# Patient Record
Sex: MALE | Race: WHITE
[De-identification: names, ages, dates, MRNs, and addresses within clinical notes are randomized per-mention and may not be internally consistent; named-entity substitution may affect disease eponyms.]

---

## 2019-03-22 ENCOUNTER — HOSPITAL ENCOUNTER (OUTPATIENT)
Dept: HOSPITAL 19 - ZLAB.WCH | Age: 74
End: 2019-03-22

## 2019-03-22 ENCOUNTER — HOSPITAL ENCOUNTER (OUTPATIENT)
Dept: HOSPITAL 6 - LAB | Age: 74
End: 2019-03-22
Attending: FAMILY MEDICINE
Payer: MEDICARE

## 2019-03-22 DIAGNOSIS — E34.9: ICD-10-CM

## 2019-03-22 DIAGNOSIS — E03.9: Primary | ICD-10-CM

## 2019-03-22 DIAGNOSIS — Z01.89: Primary | ICD-10-CM

## 2019-03-22 LAB — TESTOST SERPL-MCNC: 290 NG/DL (ref 221–716)

## 2019-03-25 ENCOUNTER — HOSPITAL ENCOUNTER (OUTPATIENT)
Dept: HOSPITAL 19 - ZLAB.WCH | Age: 74
End: 2019-03-25

## 2019-03-25 ENCOUNTER — HOSPITAL ENCOUNTER (OUTPATIENT)
Dept: HOSPITAL 6 - LAB | Age: 74
End: 2019-03-25
Attending: FAMILY MEDICINE
Payer: MEDICARE

## 2019-03-25 DIAGNOSIS — Z00.00: Primary | ICD-10-CM

## 2019-03-25 DIAGNOSIS — Z01.89: Primary | ICD-10-CM

## 2019-03-25 DIAGNOSIS — J30.1: ICD-10-CM

## 2019-03-25 DIAGNOSIS — E11.9: ICD-10-CM

## 2019-03-25 LAB
ALBUMIN SERPL-MCNC: 4.4 G/DL (ref 3.5–5)
ALT SERPL-CCNC: 25 U/L (ref 21–72)
AST SERPL-CCNC: 26 U/L (ref 17–59)
BASOPHILS # BLD: 0.1 10*3/UL (ref 0.02–0.1)
BILIRUB SERPL-MCNC: 0.6 MG/DL (ref 0.2–1.3)
CALCIUM SERPL-MCNC: 9.4 MG/DL (ref 8.4–10.2)
CO2 SERPL-SCNC: 25 MMOL/L (ref 22–30)
EOSINOPHIL # BLD: 0.2 10*3/UL (ref 0.04–0.4)
EOSINOPHIL NFR BLD: 3.2 % (ref 0–4)
ERYTHROCYTE [DISTWIDTH] IN BLOOD BY AUTOMATED COUNT: 15.1 % (ref 11.5–14.5)
GLUCOSE SERPL-MCNC: 99 MG/DL (ref 75–110)
HCT VFR BLD AUTO: 41.5 % (ref 42–52)
HGB BLD-MCNC: 13.3 G/DL (ref 13.5–18)
LYMPHOCYTES # BLD: 1.5 10*3/UL (ref 1.5–4)
MCH RBC QN AUTO: 29 PG (ref 27–31)
MCHC RBC AUTO-ENTMCNC: 32 G/DL (ref 33–37)
MCV RBC AUTO: 90 FL (ref 78–100)
MONOCYTES # BLD: 0.7 10*3/UL (ref 0.2–0.8)
NEUTROPHILS # BLD: 3.7 10*3/UL (ref 1.4–6.5)
PLATELET # BLD AUTO: 181 K/MM3 (ref 130–400)
PMV BLD AUTO: 10 FL (ref 7.4–10.4)
POTASSIUM SERPL-SCNC: 4.2 MMOL/L (ref 3.6–5)
PROT SERPL-MCNC: 7.4 G/DL (ref 6.3–8.2)
RBC # BLD AUTO: 4.61 M/MM3 (ref 4.2–5.6)
SODIUM SERPL-SCNC: 142 MMOL/L (ref 137–145)
WBC # BLD AUTO: 6.2 K/MM3 (ref 4.8–10.8)

## 2019-08-05 ENCOUNTER — HOSPITAL ENCOUNTER (OUTPATIENT)
Dept: HOSPITAL 19 - COL.CR | Age: 74
LOS: 1 days | Discharge: HOME | End: 2019-08-06
Payer: MEDICARE

## 2019-08-05 DIAGNOSIS — Z48.812: Primary | ICD-10-CM

## 2019-08-05 DIAGNOSIS — Z98.61: ICD-10-CM

## 2019-08-12 ENCOUNTER — HOSPITAL ENCOUNTER (OUTPATIENT)
Dept: HOSPITAL 19 - COL.CR | Age: 74
LOS: 90 days | Discharge: HOME | End: 2019-11-10
Payer: MEDICARE

## 2019-08-12 DIAGNOSIS — Z95.5: ICD-10-CM

## 2019-08-12 DIAGNOSIS — Z48.812: Primary | ICD-10-CM

## 2019-08-13 ENCOUNTER — HOSPITAL ENCOUNTER (OUTPATIENT)
Dept: HOSPITAL 19 - COL.PUL | Age: 74
End: 2019-08-13
Payer: MEDICARE

## 2019-08-13 DIAGNOSIS — R06.00: Primary | ICD-10-CM

## 2019-09-11 ENCOUNTER — HOSPITAL ENCOUNTER (OUTPATIENT)
Dept: HOSPITAL 6 - LAB | Age: 74
End: 2019-09-11
Attending: FAMILY MEDICINE
Payer: MEDICARE

## 2019-09-11 DIAGNOSIS — E11.9: Primary | ICD-10-CM

## 2020-07-09 ENCOUNTER — HOSPITAL ENCOUNTER (OUTPATIENT)
Dept: HOSPITAL 6 - LAB | Age: 75
End: 2020-07-09
Attending: FAMILY MEDICINE
Payer: MEDICARE

## 2020-07-09 DIAGNOSIS — E03.9: ICD-10-CM

## 2020-07-09 DIAGNOSIS — Z00.00: Primary | ICD-10-CM

## 2020-07-09 DIAGNOSIS — Z12.5: ICD-10-CM

## 2020-07-09 DIAGNOSIS — E78.00: ICD-10-CM

## 2020-07-09 DIAGNOSIS — E11.9: ICD-10-CM

## 2020-07-09 DIAGNOSIS — E34.9: ICD-10-CM

## 2020-07-09 LAB
ALBUMIN SERPL-MCNC: 4.2 G/DL (ref 3.4–4.8)
ALT SERPL-CCNC: 42 U/L (ref 0–55)
AST SERPL-CCNC: 31 U/L (ref 5–34)
BILIRUB SERPL-MCNC: 0.5 MG/DL (ref 0.2–1.2)
CALCIUM SERPL-MCNC: 9.1 MG/DL (ref 8.3–10.5)
CO2 SERPL-SCNC: 20 MMOL/L (ref 23–31)
ERYTHROCYTE [DISTWIDTH] IN BLOOD BY AUTOMATED COUNT: 14.9 % (ref 11.5–14.5)
GLUCOSE SERPL-MCNC: 108 MG/DL (ref 75–110)
HCT VFR BLD AUTO: 43.5 % (ref 42–52)
HGB BLD-MCNC: 14 G/DL (ref 13.5–18)
MCH RBC QN AUTO: 29 PG (ref 27–31)
MCHC RBC AUTO-ENTMCNC: 32 G/DL (ref 33–37)
MCV RBC AUTO: 89 FL (ref 78–100)
PLATELET # BLD AUTO: 158 K/MM3 (ref 130–400)
PMV BLD AUTO: 9.9 FL (ref 7.4–10.4)
POTASSIUM SERPL-SCNC: 4.2 MMOL/L (ref 3.5–5.1)
PROT SERPL-MCNC: 7.4 G/DL (ref 6.2–8.1)
RBC # BLD AUTO: 4.91 M/MM3 (ref 4.2–5.6)
SODIUM SERPL-SCNC: 141 MMOL/L (ref 136–145)
WBC # BLD AUTO: 5.6 K/MM3 (ref 4.8–10.8)

## 2020-07-10 LAB
LYMPHOCYTES NFR BLD MANUAL: 28 % (ref 20–51)
MONOCYTES NFR BLD: 13 % (ref 3–10)
NEUTS SEG NFR BLD MANUAL: 55 % (ref 42–75)

## 2021-01-26 ENCOUNTER — HOSPITAL ENCOUNTER (OUTPATIENT)
Dept: HOSPITAL 6 - LAB | Age: 76
End: 2021-01-26
Attending: FAMILY MEDICINE
Payer: MEDICARE

## 2021-01-26 DIAGNOSIS — Z12.5: ICD-10-CM

## 2021-01-26 DIAGNOSIS — E78.5: ICD-10-CM

## 2021-01-26 DIAGNOSIS — Z00.00: Primary | ICD-10-CM

## 2021-01-26 DIAGNOSIS — E03.9: ICD-10-CM

## 2021-01-26 DIAGNOSIS — E13.9: ICD-10-CM

## 2021-01-26 LAB
ALBUMIN SERPL-MCNC: 4.7 G/DL (ref 3.4–4.8)
ALT SERPL-CCNC: 28 U/L (ref 0–55)
AST SERPL-CCNC: 23 U/L (ref 5–34)
BASOPHILS # BLD: 0.1 10*3/UL (ref 0.02–0.1)
BILIRUB SERPL-MCNC: 0.8 MG/DL (ref 0.2–1.2)
CALCIUM SERPL-MCNC: 9.6 MG/DL (ref 8.3–10.5)
CO2 SERPL-SCNC: 23 MMOL/L (ref 23–31)
CREAT UR-MCNC: 115 MG/DL
EOSINOPHIL # BLD: 0.1 10*3/UL (ref 0.04–0.4)
EOSINOPHIL NFR BLD: 1.2 % (ref 0–4)
ERYTHROCYTE [DISTWIDTH] IN BLOOD BY AUTOMATED COUNT: 14.6 % (ref 11.5–14.5)
GLUCOSE SERPL-MCNC: 100 MG/DL (ref 75–110)
HCT VFR BLD AUTO: 46.5 % (ref 42–52)
HGB BLD-MCNC: 14.9 G/DL (ref 13.5–18)
LYMPHOCYTES # BLD: 1.6 10*3/UL (ref 1.5–4)
MCH RBC QN AUTO: 29 PG (ref 27–31)
MCHC RBC AUTO-ENTMCNC: 32 G/DL (ref 33–37)
MCV RBC AUTO: 90 FL (ref 78–100)
MONOCYTES # BLD: 0.7 10*3/UL (ref 0.2–0.8)
NEUTROPHILS # BLD: 4.1 10*3/UL (ref 1.4–6.5)
PLATELET # BLD AUTO: 178 K/MM3 (ref 130–400)
PMV BLD AUTO: 9.7 FL (ref 7.4–10.4)
POTASSIUM SERPL-SCNC: 4.4 MMOL/L (ref 3.5–5.1)
PROT SERPL-MCNC: 7.9 G/DL (ref 6.2–8.1)
RBC # BLD AUTO: 5.19 M/MM3 (ref 4.2–5.6)
SODIUM SERPL-SCNC: 141 MMOL/L (ref 136–145)
WBC # BLD AUTO: 6.5 K/MM3 (ref 4.8–10.8)

## 2021-01-27 ENCOUNTER — HOSPITAL ENCOUNTER (OUTPATIENT)
Dept: HOSPITAL 6 - LAB | Age: 76
End: 2021-01-27
Attending: FAMILY MEDICINE
Payer: MEDICARE

## 2021-01-27 DIAGNOSIS — M43.8X6: ICD-10-CM

## 2021-01-27 DIAGNOSIS — M47.816: Primary | ICD-10-CM

## 2022-07-29 ENCOUNTER — HOSPITAL ENCOUNTER (OUTPATIENT)
Dept: HOSPITAL 19 - WSPT | Age: 77
LOS: 2 days | Discharge: HOME | End: 2022-07-31
Attending: ORTHOPAEDIC SURGERY
Payer: MEDICARE

## 2022-07-29 DIAGNOSIS — Z96.651: Primary | ICD-10-CM

## 2022-08-31 ENCOUNTER — HOSPITAL ENCOUNTER (OUTPATIENT)
Dept: HOSPITAL 19 - WSPT | Age: 77
Discharge: HOME | End: 2022-08-31
Attending: ORTHOPAEDIC SURGERY
Payer: MEDICARE

## 2022-08-31 DIAGNOSIS — Z96.651: Primary | ICD-10-CM

## 2022-10-20 ENCOUNTER — HOSPITAL ENCOUNTER (OUTPATIENT)
Dept: HOSPITAL 19 - WSPT | Age: 77
LOS: 11 days | Discharge: HOME | End: 2022-10-31
Attending: ORTHOPAEDIC SURGERY
Payer: MEDICARE

## 2022-10-20 DIAGNOSIS — T84.82XA: Primary | ICD-10-CM

## 2022-10-20 DIAGNOSIS — Z96.651: ICD-10-CM

## 2022-10-28 ENCOUNTER — HOSPITAL ENCOUNTER (OUTPATIENT)
Dept: HOSPITAL 19 - SDCO | Age: 77
Discharge: HOME | End: 2022-10-28
Attending: SURGERY
Payer: MEDICARE

## 2022-10-28 VITALS — DIASTOLIC BLOOD PRESSURE: 78 MMHG | SYSTOLIC BLOOD PRESSURE: 110 MMHG | HEART RATE: 48 BPM

## 2022-10-28 VITALS — TEMPERATURE: 97.1 F

## 2022-10-28 VITALS — HEART RATE: 48 BPM | SYSTOLIC BLOOD PRESSURE: 111 MMHG | DIASTOLIC BLOOD PRESSURE: 76 MMHG

## 2022-10-28 VITALS — TEMPERATURE: 97 F | HEART RATE: 53 BPM | DIASTOLIC BLOOD PRESSURE: 62 MMHG | SYSTOLIC BLOOD PRESSURE: 117 MMHG

## 2022-10-28 VITALS — DIASTOLIC BLOOD PRESSURE: 81 MMHG | TEMPERATURE: 97.4 F | HEART RATE: 56 BPM | SYSTOLIC BLOOD PRESSURE: 161 MMHG

## 2022-10-28 VITALS — HEIGHT: 72.99 IN | BODY MASS INDEX: 36.67 KG/M2 | WEIGHT: 276.68 LBS

## 2022-10-28 DIAGNOSIS — D36.0: ICD-10-CM

## 2022-10-28 DIAGNOSIS — C43.59: Primary | ICD-10-CM

## 2022-10-28 PROCEDURE — A9520 TC99 TILMANOCEPT DIAG 0.5MCI: HCPCS

## 2022-10-28 NOTE — NUR
The patient appears to be resting comfortably on the cart. Call light remains
wtihin reach. Wife continues to be at his bedside.

## 2022-10-28 NOTE — NUR
Discharge instructions were reviewed with the patient and his wife at this
time. They both verbalized understanding and have no questions for the nurse
at this time. The patient's IV to his left hand was removed and a pressure
dressing was applied. The patient ambulated to the bathroom with the stand by
assistance of one nurse and appeared to tolerate the activity well. He voided
without difficulty and is going to get dressed.

## 2022-10-28 NOTE — NUR
The patient arrived back to [facility] from the recovery room at this time. The
patient appears alert and oriented and denies any pain or nausea at this time.
The patient has some water and ice chips from PACU and appears to be
tolerating both well and denies wanting anything further at this time. Post
operative vital signs were started at this time. The patient's wife is at his
bedside. Call light is within reach. Dressing to right neck and back appear
clean, dry and intact. Denies any further needs.

## 2022-10-28 NOTE — NUR
The patient was escorted out via wheelchair to a private vehicle by [employer].
The patient's belongings and discharge were sent with him. The patietn's wife
is present to drive him home.

## 2023-09-29 ENCOUNTER — HOSPITAL ENCOUNTER (OUTPATIENT)
Dept: HOSPITAL 19 - WSPT | Age: 78
LOS: 1 days | Discharge: HOME | End: 2023-09-30
Payer: MEDICARE

## 2023-09-29 DIAGNOSIS — M75.01: Primary | ICD-10-CM
